# Patient Record
Sex: FEMALE | Race: BLACK OR AFRICAN AMERICAN | URBAN - METROPOLITAN AREA
[De-identification: names, ages, dates, MRNs, and addresses within clinical notes are randomized per-mention and may not be internally consistent; named-entity substitution may affect disease eponyms.]

---

## 2017-12-28 ENCOUNTER — OFFICE VISIT (OUTPATIENT)
Dept: FAMILY MEDICINE | Facility: CLINIC | Age: 47
End: 2017-12-28
Payer: COMMERCIAL

## 2017-12-28 VITALS
SYSTOLIC BLOOD PRESSURE: 154 MMHG | RESPIRATION RATE: 18 BRPM | TEMPERATURE: 99.1 F | HEIGHT: 66 IN | WEIGHT: 228.6 LBS | HEART RATE: 86 BPM | BODY MASS INDEX: 36.74 KG/M2 | OXYGEN SATURATION: 99 % | DIASTOLIC BLOOD PRESSURE: 92 MMHG

## 2017-12-28 DIAGNOSIS — R03.0 ELEVATED BLOOD PRESSURE READING WITHOUT DIAGNOSIS OF HYPERTENSION: ICD-10-CM

## 2017-12-28 DIAGNOSIS — Z00.00 HEALTH CARE MAINTENANCE: ICD-10-CM

## 2017-12-28 DIAGNOSIS — R53.1 RIGHT SIDED WEAKNESS: Primary | ICD-10-CM

## 2017-12-28 LAB
% GRANULOCYTES: 43.1 %G (ref 40–75)
GRANULOCYTES #: 3.3 K/UL (ref 1.6–8.3)
HCT VFR BLD AUTO: 39.2 % (ref 35–47)
HEMOGLOBIN: 12.2 G/DL (ref 11.7–15.7)
LYMPHOCYTES # BLD AUTO: 3.7 K/UL (ref 0.8–5.3)
LYMPHOCYTES NFR BLD AUTO: 47.7 %L (ref 20–48)
MCH RBC QN AUTO: 29.4 PG (ref 26.5–35)
MCHC RBC AUTO-ENTMCNC: 31.1 G/DL (ref 32–36)
MCV RBC AUTO: 94.5 FL (ref 78–100)
MID #: 0.7 K/UL (ref 0–2.2)
MID %: 9.2 %M (ref 0–20)
PLATELET # BLD AUTO: 378 K/UL (ref 150–450)
RBC # BLD AUTO: 4.15 M/UL (ref 3.8–5.2)
WBC # BLD AUTO: 7.7 K/UL (ref 4–11)

## 2017-12-28 NOTE — PATIENT INSTRUCTIONS
Here is the plan from today's visit    1. Right sided weakness  - CT Head w/o & w Contrast; Future  - CBC with Diff Plt ([facility])  - Lipid panel reflex to direct LDL Non-fasting; Future  - You will have to call to make the appointment for head CT.     2. Health care maintenance  - Pap imaged thin layer screen with HPV - recommended age 30 - 65 years (select HPV order below)    3. Elevated blood pressure reading without diagnosis of hypertension  - Basic Metabolic Panel ([facility])  - TSH with free T4 reflex      Please call or return to clinic if your symptoms don't go away.    Follow up plan  Please make a clinic appointment for follow up with me (YARI BAINS) in 4-6  weeks for follow up.    Thank you for coming to [facility] today.  Lab Testing:  **If you had lab testing today and your results are reassuring or normal they will be mailed to you or sent through [employer] within 7 days.   **If the lab tests need quick action we will call you with the results.  The phone number we will call with results is # [phone] (home) . If this is not the best number please call our clinic and change the number.  Medication Refills:  If you need any refills please call your pharmacy and they will contact us.   If you need to  your refill at a new pharmacy, please contact the new pharmacy directly. The new pharmacy will help you get your medications transferred faster.   Scheduling:  If you have any concerns about today's visit or wish to schedule another appointment please call our office during normal business hours [phone] (8-5:00 M-F)  If a referral was made to a [facility] and you don't get a call from central scheduling please call [phone].  If a Mammogram was ordered for you at [facility] call [phone] to schedule or change your appointment.  If you had an XRay/CT/Ultrasound/MRI ordered the number is [phone] to schedule or change your radiology  appointment.   Medical Concerns:  If you have urgent medical concerns please call [phone] at any time of the day.

## 2017-12-28 NOTE — LETTER
December 30, 2017      Vannessa ALLEN Oleksandr  [address] [place] [id number]        Dear Vannessa,    Thank you for getting your care at [facility]. Please see below for your test results.    Resulted Orders   CBC with Diff Plt (\[facility] [place] [facility]\"")   Result Value Ref Range    WBC 7.7 4.0 - 11.0 K/uL    Lymphocytes # 3.7 0.8 - 5.3 K/uL    % Lymphocytes 47.7 20.0 - 48.0 %L    Mid # 0.7 0.0 - 2.2 K/uL    Mid % 9.2 0.0 - 20.0 %M    GRANULOCYTES # 3.3 1.6 - 8.3 K/uL    % Granulocytes 43.1 40.0 - 75.0 %G    RBC 4.15 3.80 - 5.20 M/uL    Hemoglobin 12.2 11.7 - 15.7 g/dL    Hematocrit 39.2 35.0 - 47.0 %    MCV 94.5 78.0 - 100.0 fL    MCH 29.4 26.5 - 35.0 pg    MCHC 31.1 (L) 32.0 - 36.0 g/dL    Platelets 378.0 150.0 - 450.0 K/uL   Basic Metabolic Panel (\[facility] [place] [facility]\"")   Result Value Ref Range    Urea Nitrogen 15.7 7.0 - 19.0 mg/dL    Calcium 9.1 8.5 - 10.1 mg/dL    Chloride 102.6 98.0 - 110.0 mmol/L    Carbon Dioxide 28.0 20.0 - 32.0 mmol/L    Creatinine 0.7 0.5 - 1.0 mg/dL    Glucose 99.7 (H) 70.0 - 99.0 mg'dL    Potassium 3.8 3.3 - 4.5 mmol/dL    Sodium 136.8 132.6 - 141.4 mmol/L    GFR Estimate >90 >60.0 mL/min/1.7 m2    GFR Estimate If Black >90 >60.0 mL/min/1.7 m2   TSH with free T4 reflex   Result Value Ref Range    TSH 1.71 0.40 - 4.00 mU/L     You do not have any anemia, your thyroid is normal, your kidney function is normal. The cholesterol is the only thing I do not have, which would be ideal if you would come in for a lab only fasting cholesterol check to get the most accurate reading.     If you have any concerns about these results please call and leave a message for me or send a MyChart message to the clinic.    Sincerely,    Alannah Espitia MD

## 2017-12-28 NOTE — LETTER
January 5, 2018      Vannessa ALLEN Oleksandr  [address] [facility] [address]        Dear Vannessa,    Thank you for getting your care at [facility]. Please see below for your test results.    Resulted Orders   CBC with Diff Plt ([facility])   Result Value Ref Range    WBC 7.7 4.0 - 11.0 K/uL    Lymphocytes # 3.7 0.8 - 5.3 K/uL    % Lymphocytes 47.7 20.0 - 48.0 %L    Mid # 0.7 0.0 - 2.2 K/uL    Mid % 9.2 0.0 - 20.0 %M    GRANULOCYTES # 3.3 1.6 - 8.3 K/uL    % Granulocytes 43.1 40.0 - 75.0 %G    RBC 4.15 3.80 - 5.20 M/uL    Hemoglobin 12.2 11.7 - 15.7 g/dL    Hematocrit 39.2 35.0 - 47.0 %    MCV 94.5 78.0 - 100.0 fL    MCH 29.4 26.5 - 35.0 pg    MCHC 31.1 (L) 32.0 - 36.0 g/dL    Platelets 378.0 150.0 - 450.0 K/uL   Basic Metabolic Panel ([facility])   Result Value Ref Range    Urea Nitrogen 15.7 7.0 - 19.0 mg/dL    Calcium 9.1 8.5 - 10.1 mg/dL    Chloride 102.6 98.0 - 110.0 mmol/L    Carbon Dioxide 28.0 20.0 - 32.0 mmol/L    Creatinine 0.7 0.5 - 1.0 mg/dL    Glucose 99.7 (H) 70.0 - 99.0 mg'dL    Potassium 3.8 3.3 - 4.5 mmol/dL    Sodium 136.8 132.6 - 141.4 mmol/L    GFR Estimate >90 >60.0 mL/min/1.7 m2    GFR Estimate If Black >90 >60.0 mL/min/1.7 m2   TSH with free T4 reflex   Result Value Ref Range    TSH 1.71 0.40 - 4.00 mU/L   Pap imaged thin layer screen with HPV - recommended age 30 - 65 years (select HPV order below)   Result Value Ref Range    PAP NIL     Copath Report         Acc#:    Signed: 1/4/2018 08:20   MR#: [id number]    SPECIMEN/STAIN PROCESS:  Pap imaged thin layer prep screening (Surepath, FocalPoint with guided   screening)       Pap-Cyto x 1, HPV ordered x 1    SOURCE: Cervical, endocervical  ----------------------------------------------------------------   Pap imaged thin layer prep screening (Surepath, FocalPoint with guided   screening)  SPECIMEN ADEQUACY:  Satisfactory for evaluation.  -Transformation zone component present.    CYTOLOGIC INTERPRETATION:    Negative for intraepithelial  lesion or malignancy    Electronically signed by:  VAUGHN Carrington (ASCP)    CLINICAL HISTORY:    Post Menopausal, Previous normal pap  Date of Last Pap: 1/22/2010,    Papanicolaou Test Limitations:  Cervical cytology is a screening test with   limited sensitivity; regular  screening is critical for cancer prevention; Pap tests are primarily   effective for the diagnosis/prevention of  squamous cell carcinoma, not adenocarcinomas or other cancers.  [facility] LOCATION:  [address] [address], [phone]  Processed and screened at [facility]     HPV High Risk Types DNA Cervical   Result Value Ref Range    HPV 16 DNA Negative NEG^Negative    HPV 18 DNA Negative NEG^Negative    Other HR HPV Negative NEG^Negative    Final Diagnosis This patient's sample is negative for HPV DNA.       Comment:      (Note)  METHODOLOGY:  The Roche destiney 4800 system uses automated extraction,   simultaneous amplification of HPV (L1 region) and beta-globin,    followed by  real time detection of fluorescent labeled HPV and beta   globin using specific oligonucleotide probes . The test specifically   identifies types HPV 16 DNA and HPV 18 DNA while concurrently   detecting the rest of the high risk types (31, 33, 35, 39, 45, 51,   52, 56, 58, 59, 66 or 68).  COMMENTS:  This test is not intended for use as a screening device   for women under age 30 with normal cervical cytology.  Results should   be correlated with cytologic and histologic findings. Close clinical   followup is recommended.  This test was developed and its performance characteristics   determined by the [facility]. It has not been cleared or approved by the   FDA. The laboratory is regulated under CLIA as qualified to perform   high-complexity testing. This test is used    for clinical purposes. It   should not be regarded as investigational or for research.      Specimen Description Cervical Cells       Comment:      [id number]     Your Pap smear and HPV are negative. Your next Pap will be due in 5 years.     If you have any concerns about these results please call and leave a message for me or send a MyChart message to the clinic.    Sincerely,    Alannah Espitia MD

## 2017-12-28 NOTE — PROGRESS NOTES
Preceptor Attestation:   Patient seen and discussed with the resident. Assessment and plan reviewed with resident and agreed upon.   Supervising Physician:  Alexa Zavaleta MD  [facility]

## 2017-12-28 NOTE — PROGRESS NOTES
HPI:       Vannessa Leggett is a 47 year old who presents for the following  Patient presents with:  Establish Care  Neurologic Problem: Left arm numbness, slurred speech, head pressure       Dizziness/Vertigo  Onset: 1-2 days, had an episode 3 years ago with face drooping  What brings it on? Nothing in particular    Description:   Do you feel like you are going to faint?:  No   Does it feel like the surroundings (bed, room) are moving?: No   Have you felt unsteady/off balance?: No     Have you passed out or fallen?: No, but felt like she could have fallen out yesterday.    Intensity: 8/10, when Sx are present    Progression of Symptoms:  same    Accompanying Signs & Symptoms:  Heart palpitations: YES pt states that she has them when she doesn't get much sleep  Nausea, vomiting: YES nausea, with exhibiting dizziness Sx, and when she turns her head swiftly  Weakness in arms or legs:  YES 2 days ago, had weakness in left arm, went limp  Fatigue:  No  Vision or speech changes:  YES had some speech slurring 2 days ago  Ringing in ears (Tinnitus):No   Hearing Loss: No     History:   Head trauma/concussion hx:No   Previous similar symptoms:  YES, have had Sx in the past of dizziness  Recent bleeding history: No     Precipitating factors:   Worse with activity or head movement?:  YES only with head movement  Any new medications (BP?):No   Alcohol/drug abuse/withdrawal: No     Alleviating factors:   Does staying in a fixed position give relief?:yes sitting down helps  Therapies Tried and outcome: Nothing    Patient states that her family has prompted her to be seen by a doctor at this time.  She has not seen a physician in many years.  Reports that she will unlikely return to be seen for follow-up.  In any procedures, labs, or imaging that would be needed would need to be done now.  She denies taking any medication or over-the-counter meds or supplements.  She denies any changes in her diet.  She denies any history of  "cardiac disease, blood clots, or family history of strokes.  She denies any headaches, changes in vision, fever, chills, nausea, vomiting, abdominal pain, chest pain, shortness of breath, or changes in skin.  She reports that she has been postmenopausal for many years.  She denies having any menopausal symptoms that were concerning.  She is not currently sexually active.  She states that she is an active, healthy individual with no significant health history.    Problem, Medication and Allergy Lists were   reviewed and are current.   There are no active problems to display for this patient.  ,     No current outpatient prescriptions on file.   ,   No Known Allergies     Patient is a new patient to this clinic and so  I reviewed/updated the Past Medical History, the Family History and the Social History.          Review of Systems:   Review of Systems   Constitutional: Negative for appetite change, chills, fatigue and fever.   HENT: Negative for congestion, sinus pressure and sore throat.    Eyes: Negative for visual disturbance.   Respiratory: Negative for cough, shortness of breath and wheezing.    Cardiovascular: Negative for chest pain and leg swelling.   Gastrointestinal: Negative for abdominal distention, abdominal pain, blood in stool, constipation, diarrhea, nausea and vomiting.   Genitourinary: Negative for dysuria and hematuria.   Musculoskeletal: Negative for arthralgias and myalgias.   Skin: Negative for color change and rash.   Neurological: Negative for weakness and headaches.             Physical Exam:   Patient Vitals for the past 24 hrs:   BP Temp Temp src Pulse Resp SpO2 Height Weight   12/28/17 1625 151/83 99.1  F (37.3  C) Oral 86 18 99 % 5' 6.3\" (168.4 cm) 228 lb 9.6 oz (103.7 kg)     Body mass index is 36.56 kg/(m^2).  Vital signs normal except elevated blood pressure.      Physical Exam   Constitutional: She is oriented to person, place, and time. She appears well-developed and well-nourished. " No distress.   HENT:   Head: Normocephalic and atraumatic.   Nose: Nose normal.   Mouth/Throat: Oropharynx is clear and moist. No oropharyngeal exudate.   Eyes: Conjunctivae are normal. Pupils are equal, round, and reactive to light. Right eye exhibits no discharge. Left eye exhibits no discharge. No scleral icterus.   Neck: Normal range of motion. Neck supple. No thyromegaly present.   Cardiovascular: Normal rate, regular rhythm and normal heart sounds.  Exam reveals no gallop and no friction rub.    No murmur heard.  Pulmonary/Chest: Effort normal and breath sounds normal. No respiratory distress. She has no wheezes. She has no rales.   Abdominal: Soft. Bowel sounds are normal. She exhibits no mass. There is no tenderness.   Musculoskeletal: Normal range of motion. She exhibits no edema or tenderness.   Lymphadenopathy:     She has no cervical adenopathy.   Neurological: She is alert and oriented to person, place, and time. She displays no tremor and normal reflexes. No cranial nerve deficit or sensory deficit. She exhibits normal muscle tone. Coordination and gait normal.   Skin: Skin is warm. No rash noted. She is not diaphoretic.         Results:     Results for orders placed or performed in visit on 12/28/17   CBC with Diff Plt ([facility])   Result Value Ref Range    WBC 7.7 4.0 - 11.0 K/uL    Lymphocytes # 3.7 0.8 - 5.3 K/uL    % Lymphocytes 47.7 20.0 - 48.0 %L    Mid # 0.7 0.0 - 2.2 K/uL    Mid % 9.2 0.0 - 20.0 %M    GRANULOCYTES # 3.3 1.6 - 8.3 K/uL    % Granulocytes 43.1 40.0 - 75.0 %G    RBC 4.15 3.80 - 5.20 M/uL    Hemoglobin 12.2 11.7 - 15.7 g/dL    Hematocrit 39.2 35.0 - 47.0 %    MCV 94.5 78.0 - 100.0 fL    MCH 29.4 26.5 - 35.0 pg    MCHC 31.1 (L) 32.0 - 36.0 g/dL    Platelets 378.0 150.0 - 450.0 K/uL   Basic Metabolic Panel ([facility])   Result Value Ref Range    Urea Nitrogen 15.7 7.0 - 19.0 mg/dL    Calcium 9.1 8.5 - 10.1 mg/dL    Chloride 102.6 98.0 - 110.0 mmol/L    Carbon Dioxide 28.0 20.0 - 32.0  mmol/L    Creatinine 0.7 0.5 - 1.0 mg/dL    Glucose 99.7 (H) 70.0 - 99.0 mg'dL    Potassium 3.8 3.3 - 4.5 mmol/dL    Sodium 136.8 132.6 - 141.4 mmol/L    GFR Estimate >90 >60.0 mL/min/1.7 m2    GFR Estimate If Black >90 >60.0 mL/min/1.7 m2   TSH with free T4 reflex   Result Value Ref Range    TSH 1.71 0.40 - 4.00 mU/L         Assessment and Plan     Vannessa is a 47-year-old postmenopausal woman with no significant health history who presents to the clinic for symptoms concerning of stroke.  She does not have any current symptoms today, however has had 2 episodes of right-sided weakness, with slurring of words, and facial droop.  The symptoms seem to have resolved and she does not exhibit any sequelae of stroke.  Etiology is unknown.      Patient is due for multiple health maintenance screening, including mammogram, Pap smear. Patient is hypertensive on exam today, including on repeat blood pressure check, but she does not have formal diagnosis of hypertension and she is not on any medications.  Discussed that we would need to get a repeat blood pressure check, however patient is not agreeable for regular follow-up.    We will obtain an MRI and MRA of the brain as this will give us the best imaging for current and past possible infarction pathology.  Also, patient is unlikely to be agreeable for further testing.      1. Right sided weakness  - CBC with Diff Plt ([facility])  - Lipid panel reflex to direct LDL Fasting; Future  - MR Brain w/o & w Contrast; Future  - MRA Angiogram Brain; Future    2. Health care maintenance  - Pap imaged thin layer screen with HPV - recommended age 30 - 65 years (select HPV order below)    3. Elevated blood pressure reading without diagnosis of hypertension  - Basic Metabolic Panel ([facility])  - TSH with free T4 reflex    There are no discontinued medications.  Options for treatment and follow-up care were reviewed with the patient. Vannessa Leggett  engaged in the decision making process and  verbalized understanding of the options discussed and agreed with the final plan.    lAannah Espitia MD  [facility]  [phone]

## 2017-12-28 NOTE — MR AVS SNAPSHOT
After Visit Summary   12/28/2017    Vannessa Leggett    MRN: [id number]           Patient Information     Date Of Birth          1970        Visit Information        Provider Department      12/28/2017 4:00 PM Alannah Espitia MD [facility]        Today's Diagnoses     Right sided weakness    -  1    Health care maintenance        Elevated blood pressure reading without diagnosis of hypertension          Care Instructions    Here is the plan from today's visit    1. Right sided weakness  - CT Head w/o & w Contrast; Future  - CBC with Diff Plt ([facility])  - Lipid panel reflex to direct LDL Non-fasting; Future  - You will have to call to make the appointment for head CT.     2. Health care maintenance  - Pap imaged thin layer screen with HPV - recommended age 30 - 65 years (select HPV order below)    3. Elevated blood pressure reading without diagnosis of hypertension  - Basic Metabolic Panel ([facility])  - TSH with free T4 reflex      Please call or return to clinic if your symptoms don't go away.    Follow up plan  Please make a clinic appointment for follow up with me (ALANNAH ESPITIA) in 4-6  weeks for follow up.    Thank you for coming to [facility] today.  Lab Testing:  **If you had lab testing today and your results are reassuring or normal they will be mailed to you or sent through [employer] within 7 days.   **If the lab tests need quick action we will call you with the results.  The phone number we will call with results is # [phone] (home) . If this is not the best number please call our clinic and change the number.  Medication Refills:  If you need any refills please call your pharmacy and they will contact us.   If you need to  your refill at a new pharmacy, please contact the new pharmacy directly. The new pharmacy will help you get your medications transferred faster.   Scheduling:  If you have any concerns about today's visit or wish to  schedule another appointment please call our office during normal business hours [phone] (8-5:00 M-F)  If a referral was made to a [facility] and you don't get a call from central scheduling please call [phone].  If a Mammogram was ordered for you at [facility] call [phone] to schedule or change your appointment.  If you had an XRay/CT/Ultrasound/MRI ordered the number is [phone] to schedule or change your radiology appointment.   Medical Concerns:  If you have urgent medical concerns please call [phone] at any time of the day.            Follow-ups after your visit        Future tests that were ordered for you today     Open Future Orders        Priority Expected Expires Ordered    Lipid panel reflex to direct LDL Non-fasting Routine 12/28/2017 1/11/2018 12/28/2017    CT Head w/o & w Contrast Routine  12/28/2018 12/28/2017            Who to contact     Please call your clinic at [phone] to:    Ask questions about your health    Make or cancel appointments    Discuss your medicines    Learn about your test results    Speak to your doctor   If you have compliments or concerns about an experience at your clinic, or if you wish to file a complaint, please contact [facility] Patient Relations at [phone] or email us at [email or web address]         Additional Information About Your Visit        MyChart Information     [employer] is an electronic gateway that provides easy, online access to your medical records. With [employer], you can request a clinic appointment, read your test results, renew a prescription or communicate with your care team.     To sign up for [employer] visit the website at [email or web address]   You will be asked to enter the access code listed below, as well as some personal information. Please follow the directions to create your username and password.     Your access code is:  [email or web address]  Expires: 3/28/2018  5:11 PM     Your access code will  in 90 days. If you need help or a new code, please contact your [facility] or call [phone] for assistance.        Care EveryWhere ID     This is your Care EveryWhere ID. This could be used by other organizations to access your [place] medical records  [id number]        Your Vitals Were     Pulse Temperature Respirations Height Pulse Oximetry Breastfeeding?    86 99.1  F (37.3  C) (Oral) 18 5' 6.3\" (168.4 cm) 99% No    BMI (Body Mass Index)                   36.56 kg/m2            Blood Pressure from Last 3 Encounters:   17 (!) 154/92    Weight from Last 3 Encounters:   17 228 lb 9.6 oz (103.7 kg)              We Performed the Following     Basic Metabolic Panel ([facility])     CBC with Diff Plt ([facility])     Pap imaged thin layer screen with HPV - recommended age 30 - 65 years (select HPV order below)     TSH with free T4 reflex        Primary Care Provider Fax #    Physician No Ref-Primary [phone]       No address on file        Equal Access to Services     YEFRI CLARKE : Hadii juani Ayala, waaxda lujamir, qaybta kaalmada adelore, joanne soliman . So [facility] [phone].    ATENCIÓN: Si habla español, tiene a andino disposición servicios gratuitos de asistencia lingüística. Llame al [phone].    We comply with applicable federal civil rights laws and [place] laws. We do not discriminate on the basis of race, color, national origin, age, disability, sex, sexual orientation, or gender identity.            Thank you!     Thank you for choosing [facility]  for your care. Our goal is always to provide you with excellent care. Hearing back from our patients is one way we can continue to improve our services. Please take a few minutes to complete the written survey that you may receive in the mail after your visit with us. Thank " you!             Your Updated Medication List - Protect others around you: Learn how to safely use, store and throw away your medicines at www.disposemymeds.org.      Notice  As of 12/28/2017  5:11 PM    You have not been prescribed any medications.

## 2017-12-29 LAB
BUN SERPL-MCNC: 15.7 MG/DL (ref 7–19)
CALCIUM SERPL-MCNC: 9.1 MG/DL (ref 8.5–10.1)
CHLORIDE SERPLBLD-SCNC: 102.6 MMOL/L (ref 98–110)
CO2 SERPL-SCNC: 28 MMOL/L (ref 20–32)
CREAT SERPL-MCNC: 0.7 MG/DL (ref 0.5–1)
GFR SERPL CREATININE-BSD FRML MDRD: >90 ML/MIN/1.7 M2
GLUCOSE SERPL-MCNC: 99.7 MG'DL (ref 70–99)
POTASSIUM SERPL-SCNC: 3.8 MMOL/DL (ref 3.3–4.5)
SODIUM SERPL-SCNC: 136.8 MMOL/L (ref 132.6–141.4)
TSH SERPL DL<=0.005 MIU/L-ACNC: 1.71 MU/L (ref 0.4–4)

## 2017-12-29 ASSESSMENT — ENCOUNTER SYMPTOMS
WHEEZING: 0
SINUS PRESSURE: 0
CHILLS: 0
FEVER: 0
HEADACHES: 0
DYSURIA: 0
SORE THROAT: 0
MYALGIAS: 0
COUGH: 0
CONSTIPATION: 0
FATIGUE: 0
BLOOD IN STOOL: 0
APPETITE CHANGE: 0
SHORTNESS OF BREATH: 0
VOMITING: 0
ABDOMINAL PAIN: 0
ABDOMINAL DISTENTION: 0
NAUSEA: 0
HEMATURIA: 0
WEAKNESS: 0
COLOR CHANGE: 0
ARTHRALGIAS: 0
DIARRHEA: 0

## 2018-01-04 ENCOUNTER — HOSPITAL ENCOUNTER (OUTPATIENT)
Dept: MRI IMAGING | Facility: CLINIC | Age: 48
End: 2018-01-04
Attending: FAMILY MEDICINE
Payer: COMMERCIAL

## 2018-01-04 DIAGNOSIS — R53.1 RIGHT SIDED WEAKNESS: ICD-10-CM

## 2018-01-04 LAB
COPATH REPORT: NORMAL
PAP: NORMAL

## 2018-01-04 PROCEDURE — A9585 GADOBUTROL INJECTION: HCPCS | Performed by: FAMILY MEDICINE

## 2018-01-04 PROCEDURE — 70549 MR ANGIOGRAPH NECK W/O&W/DYE: CPT

## 2018-01-04 PROCEDURE — 25000128 H RX IP 250 OP 636: Performed by: FAMILY MEDICINE

## 2018-01-04 RX ORDER — GADOBUTROL 604.72 MG/ML
10 INJECTION INTRAVENOUS ONCE
Status: COMPLETED | OUTPATIENT
Start: 2018-01-04 | End: 2018-01-04

## 2018-01-04 RX ADMIN — GADOBUTROL 10 ML: 604.72 INJECTION INTRAVENOUS at 08:35

## 2018-01-05 LAB
FINAL DIAGNOSIS: NORMAL
HPV HR 12 DNA CVX QL NAA+PROBE: NEGATIVE
HPV16 DNA SPEC QL NAA+PROBE: NEGATIVE
HPV18 DNA SPEC QL NAA+PROBE: NEGATIVE
SPECIMEN DESCRIPTION: NORMAL

## 2018-03-13 ENCOUNTER — RADIANT APPOINTMENT (OUTPATIENT)
Dept: GENERAL RADIOLOGY | Facility: CLINIC | Age: 48
End: 2018-03-13
Attending: FAMILY MEDICINE
Payer: COMMERCIAL

## 2018-03-13 ENCOUNTER — OFFICE VISIT (OUTPATIENT)
Dept: FAMILY MEDICINE | Facility: CLINIC | Age: 48
End: 2018-03-13
Payer: COMMERCIAL

## 2018-03-13 VITALS
OXYGEN SATURATION: 96 % | SYSTOLIC BLOOD PRESSURE: 139 MMHG | BODY MASS INDEX: 37.75 KG/M2 | TEMPERATURE: 98.1 F | HEART RATE: 75 BPM | WEIGHT: 236 LBS | RESPIRATION RATE: 18 BRPM | DIASTOLIC BLOOD PRESSURE: 84 MMHG

## 2018-03-13 DIAGNOSIS — M89.8X9 BONY GROWTH: ICD-10-CM

## 2018-03-13 DIAGNOSIS — M54.6 CHRONIC MIDLINE THORACIC BACK PAIN: ICD-10-CM

## 2018-03-13 DIAGNOSIS — G89.29 CHRONIC MIDLINE THORACIC BACK PAIN: ICD-10-CM

## 2018-03-13 DIAGNOSIS — M89.8X9 BONY GROWTH: Primary | ICD-10-CM

## 2018-03-13 NOTE — MR AVS SNAPSHOT
After Visit Summary   3/13/2018    Vannessa Leggett    MRN: [id number]           Patient Information     Date Of Birth          1970        Visit Information        Provider Department      3/13/2018 2:40 PM Alannah Espitia MD [facility]        Today's Diagnoses     Bony growth    -  1      Care Instructions    Here is the plan from today's visit    1. Bony growth  - XR Hand Right G/E 3 Views; Future      Please call or return to clinic if your symptoms don't go away.    Follow up plan  Please make a clinic appointment for follow up with me (ALANNAH ESPITIA) and Yannick Salas MD in 1-4  weeks for follow up and OMT.    Thank you for coming to [facility] today.  Lab Testing:  **If you had lab testing today and your results are reassuring or normal they will be mailed to you or sent through [employer] within 7 days.   **If the lab tests need quick action we will call you with the results.  The phone number we will call with results is # [phone] (home) . If this is not the best number please call our clinic and change the number.  Medication Refills:  If you need any refills please call your pharmacy and they will contact us.   If you need to  your refill at a new pharmacy, please contact the new pharmacy directly. The new pharmacy will help you get your medications transferred faster.   Scheduling:  If you have any concerns about today's visit or wish to schedule another appointment please call our office during normal business hours [phone] (8-5:00 M-F)  If a referral was made to a [facility] and you don't get a call from central scheduling please call [phone].  If a Mammogram was ordered for you at [facility] call [phone] to schedule or change your appointment.  If you had an XRay/CT/Ultrasound/MRI ordered the number is [phone] to schedule or change your radiology appointment.   Medical Concerns:  If  you have urgent medical concerns please call [phone] at any time of the day.    Alannah Espitia MD            Follow-ups after your visit        Future tests that were ordered for you today     Open Future Orders        Priority Expected Expires Ordered    XR Hand Right G/E 3 Views Routine 3/13/2018 3/13/2019 3/13/2018            Who to contact     Please call your clinic at [phone] to:    Ask questions about your health    Make or cancel appointments    Discuss your medicines    Learn about your test results    Speak to your doctor            Additional Information About Your Visit        [employer] Information     Secerno is an electronic gateway that provides easy, online access to your medical records. With Secerno, you can request a clinic appointment, read your test results, renew a prescription or communicate with your care team.     To sign up for Secerno visit the website at [email or web address]   You will be asked to enter the access code listed below, as well as some personal information. Please follow the directions to create your username and password.     Your access code is: [email or web address]  Expires: 3/28/2018  6:11 PM     Your access code will  in 90 days. If you need help or a new code, please contact your [facility] or call [phone] for assistance.        Care EveryWhere ID     This is your Care EveryWhere ID. This could be used by other organizations to access your [place] medical records  [id number]        Your Vitals Were     Pulse Temperature Respirations Pulse Oximetry Breastfeeding? BMI (Body Mass Index)    75 98.1  F (36.7  C) (Oral) 18 96% No 37.75 kg/m2       Blood Pressure from Last 3 Encounters:   18 139/84   17 (!) 154/92    Weight from Last 3 Encounters:   18 236 lb (107 kg)   17 228 lb 9.6 oz (103.7 kg)               Primary Care Provider Fax #    Physician No Ref-Primary [phone]       No address  on file        Equal Access to Services     EDISRIRAM VINCE : Hadii aad ku hadoumouamando Ayala, janna alicia, pamhaseeb harrisonmamarcia pierson, joanne posadas. So [facility] [phone].    ATENCIÓN: Si habla español, tiene a andino disposición servicios gratuitos de asistencia lingüística. Llame al [phone].    We comply with applicable federal civil rights laws and [place] laws. We do not discriminate on the basis of race, color, national origin, age, disability, sex, sexual orientation, or gender identity.            Thank you!     Thank you for choosing [facility]  for your care. Our goal is always to provide you with excellent care. Hearing back from our patients is one way we can continue to improve our services. Please take a few minutes to complete the written survey that you may receive in the mail after your visit with us. Thank you!             Your Updated Medication List - Protect others around you: Learn how to safely use, store and throw away your medicines at www.disposemymeds.org.      Notice  As of 3/13/2018  3:34 PM    You have not been prescribed any medications.

## 2018-03-13 NOTE — PATIENT INSTRUCTIONS
Here is the plan from today's visit    1. Bony growth  - XR Hand Right G/E 3 Views; Future      Please call or return to clinic if your symptoms don't go away.    Follow up plan  Please make a clinic appointment for follow up with me (ALANNAH ESPITIA) and Yannick Salas MD in 1-4  weeks for follow up and OMT.    Thank you for coming to [facility] today.  Lab Testing:  **If you had lab testing today and your results are reassuring or normal they will be mailed to you or sent through [employer] within 7 days.   **If the lab tests need quick action we will call you with the results.  The phone number we will call with results is # [phone] (home) . If this is not the best number please call our clinic and change the number.  Medication Refills:  If you need any refills please call your pharmacy and they will contact us.   If you need to  your refill at a new pharmacy, please contact the new pharmacy directly. The new pharmacy will help you get your medications transferred faster.   Scheduling:  If you have any concerns about today's visit or wish to schedule another appointment please call our office during normal business hours [phone] (8-5:00 M-F)  If a referral was made to a [facility] and you don't get a call from central scheduling please call [phone].  If a Mammogram was ordered for you at [facility] call [phone] to schedule or change your appointment.  If you had an XRay/CT/Ultrasound/MRI ordered the number is [phone] to schedule or change your radiology appointment.   Medical Concerns:  If you have urgent medical concerns please call [phone] at any time of the day.    Alannah Espitia MD

## 2018-03-13 NOTE — PROGRESS NOTES
HPI:       Vannessa Leggett is a 48 year old who presents for the following  Patient presents with:  Hand Pain: Rt finger pain     Patient reports that several weeks ago started to have  left index DIP itchiness and swelling.  It was initially mildly painful but now she noticed that it is firm, nontender, and is immobile.  It does not affect her range of motion or hand strength.     She has not had any further episodes of left arm weakness since she was seen last with a normal MRI.    Does not endorse upper back tenderness that is worse with sitting.  She denies any neck pain or decreased range of motion of her neck or arms.    Problem, Medication and Allergy Lists were   reviewed and are current.   There are no active problems to display for this patient.  ,     No current outpatient prescriptions on file.   ,   No Known Allergies  Patient is an established patient of this clinic.         Review of Systems:   Review of Systems   Constitutional: Negative for appetite change, chills, fatigue and fever.   HENT: Negative for congestion, sinus pressure and sore throat.    Eyes: Negative for visual disturbance.   Respiratory: Negative for cough, shortness of breath and wheezing.    Cardiovascular: Negative for chest pain and leg swelling.   Gastrointestinal: Negative for abdominal distention, abdominal pain, blood in stool, constipation, diarrhea, nausea and vomiting.   Genitourinary: Negative for dysuria and hematuria.   Musculoskeletal: Positive for back pain (upper thoracic). Negative for arthralgias and myalgias.   Skin: Negative for color change and rash.   Neurological: Negative for weakness and headaches.             Physical Exam:   Patient Vitals for the past 24 hrs:   BP Temp Temp src Pulse Resp SpO2 Weight   03/13/18 1447 139/84 98.1  F (36.7  C) Oral 75 18 96 % 236 lb (107 kg)     Body mass index is 37.75 kg/(m^2).  Vitals were reviewed and were normal     Physical Exam   Constitutional: She appears  well-developed and well-nourished. No distress.   HENT:   Head: Normocephalic and atraumatic.   Eyes: Conjunctivae are normal. No scleral icterus.   Neck: Normal range of motion. Neck supple.   Cardiovascular: Normal rate and regular rhythm.    No murmur heard.  Pulmonary/Chest: Effort normal and breath sounds normal. No respiratory distress. She has no wheezes.   Musculoskeletal:        Cervical back: She exhibits tenderness. She exhibits normal range of motion and no swelling.        Back:    Skin: She is not diaphoretic.         Results:     Result Exam: 3 views of the left hand and wrist dated 3/13/2018.     COMPARISON: None.  CLINICAL HISTORY: Bony growth.     FINDINGS: AP, oblique, and lateral views of the left hand and wrist  were obtained. The bones are well aligned. The joint spaces are  well-maintained. No erosive changes. No displaced fractures.     IMPRESSION: No acute bone abnormality in the left hand or wrist.    Assessment and Plan     Vannessa is a 48-year-old woman who presents with firm, nontender nodule on the left PIP with unremarkable x-ray.  As this growth does not appear to affect her range of motion or strength likely a benign growth.  Will need to continue to monitor.  Did discuss patient seeing OMT for her midline upper back pain likely due to poor posture.    1. Bony growth  - XR Hand Left G/E 3 Views; Unremarkable.     2. Chronic midline thoracic back pain  - recommended patient see OMT   - discussed appropriate posture    There are no discontinued medications.  Options for treatment and follow-up care were reviewed with the patient. Vannessamarcia Leggett  engaged in the decision making process and verbalized understanding of the options discussed and agreed with the final plan.    Alannah Espitia MD  [facility]  [phone]

## 2018-03-13 NOTE — Clinical Note
Do you think that growth is a bone spur? I don't know if I see any overt signs of arthritis. Please let me know what you think and I will change my note accordingly. Thanks, Cora

## 2018-03-13 NOTE — PROGRESS NOTES
Preceptor Attestation:  Patient seen and discussed with the resident. Assessment and plan reviewed with resident and agreed upon.  Supervising Physician:  Mare Lang MD

## 2018-03-14 ENCOUNTER — OFFICE VISIT (OUTPATIENT)
Dept: FAMILY MEDICINE | Facility: CLINIC | Age: 48
End: 2018-03-14
Payer: COMMERCIAL

## 2018-03-14 VITALS
HEART RATE: 75 BPM | DIASTOLIC BLOOD PRESSURE: 89 MMHG | OXYGEN SATURATION: 100 % | WEIGHT: 236.4 LBS | SYSTOLIC BLOOD PRESSURE: 153 MMHG | BODY MASS INDEX: 37.81 KG/M2 | TEMPERATURE: 98.2 F

## 2018-03-14 DIAGNOSIS — M54.2 CERVICALGIA: Primary | ICD-10-CM

## 2018-03-14 DIAGNOSIS — M99.9 NONALLOPATHIC LESION OF THORACIC REGION: ICD-10-CM

## 2018-03-14 DIAGNOSIS — M99.9 NONALLOPATHIC LESION OF LUMBAR REGION: ICD-10-CM

## 2018-03-14 DIAGNOSIS — M99.9 NONALLOPATHIC LESION OF CERVICAL REGION: ICD-10-CM

## 2018-03-14 DIAGNOSIS — G89.29 CHRONIC BILATERAL THORACIC BACK PAIN: ICD-10-CM

## 2018-03-14 DIAGNOSIS — M99.00 SOMATIC DYSFUNCTION OF HEAD REGION: ICD-10-CM

## 2018-03-14 DIAGNOSIS — M54.6 CHRONIC BILATERAL THORACIC BACK PAIN: ICD-10-CM

## 2018-03-14 ASSESSMENT — ENCOUNTER SYMPTOMS
FATIGUE: 0
SHORTNESS OF BREATH: 0
NAUSEA: 0
CONSTIPATION: 0
DYSURIA: 0
SINUS PRESSURE: 0
WEAKNESS: 0
MYALGIAS: 0
BLOOD IN STOOL: 0
ARTHRALGIAS: 0
SORE THROAT: 0
CHILLS: 0
VOMITING: 0
ABDOMINAL DISTENTION: 0
HEADACHES: 0
BACK PAIN: 1
COUGH: 0
APPETITE CHANGE: 0
COLOR CHANGE: 0
DIARRHEA: 0
HEMATURIA: 0
FEVER: 0
ABDOMINAL PAIN: 0
WHEEZING: 0

## 2018-03-14 NOTE — PATIENT INSTRUCTIONS
PLAN      10 minute yoga stretch for back pain  https://www.Poll [email or web address]/watch?v=DoROC13Bgxq    Door stretches (10 times) daily    Keep working on posture      Follow up: 2-4 weeks

## 2018-03-14 NOTE — MR AVS SNAPSHOT
After Visit Summary   3/14/2018    Vannessa Leggett    MRN: [id number]           Patient Information     Date Of Birth          1970        Visit Information        Provider Department      3/14/2018 8:40 AM Yannick Salas DO [facility]        Care Instructions    PLAN      10 minute yoga stretch for back pain  https://[email or web address] Mobile [email or web address]/watch?v=QjXOD68Qhvq    Door stretches (10 times) daily    Keep working on posture      Follow up: 2-4 weeks            Follow-ups after your visit        Who to contact     Please call your clinic at [phone] to:    Ask questions about your health    Make or cancel appointments    Discuss your medicines    Learn about your test results    Speak to your doctor            Additional Information About Your Visit        MyChart Information     Payoff is an electronic gateway that provides easy, online access to your medical records. With Payoff, you can request a clinic appointment, read your test results, renew a prescription or communicate with your care team.     To sign up for [employer] visit the website at [email or web address]   You will be asked to enter the access code listed below, as well as some personal information. Please follow the directions to create your username and password.     Your access code is: [email or web address]  Expires: 3/28/2018  6:11 PM     Your access code will  in 90 days. If you need help or a new code, please contact your [facility] or call [phone] for assistance.        Care EveryWhere ID     This is your Care EveryWhere ID. This could be used by other organizations to access your [place] medical records  [id number]        Your Vitals Were     Pulse Temperature Pulse Oximetry Breastfeeding? BMI (Body Mass Index)       75 98.2  F (36.8  C) (Oral) 100% No 37.81 kg/m2        Blood Pressure from Last 3 Encounters:   18 153/89   18 139/84   17 (!)  154/92    Weight from Last 3 Encounters:   03/14/18 236 lb 6.4 oz (107.2 kg)   03/13/18 236 lb (107 kg)   12/28/17 228 lb 9.6 oz (103.7 kg)              Today, you had the following     No orders found for display       Primary Care Provider Fax #    Physician No Ref-Primary [phone]       No address on file        Equal Access to Services     [facility] : Hadii aad ku hadasho Soomaali, waaxda luqadaha, qaybta kaalmada adeegyada, waxjohn luain hayaan demar galeanamegangigi soliman . So [facility] [phone].    ATENCIÓN: Si habla español, tiene a andino disposición servicios gratuitos de asistencia lingüística. Llame al [phone].    We comply with applicable federal civil rights laws and [place] laws. We do not discriminate on the basis of race, color, national origin, age, disability, sex, sexual orientation, or gender identity.            Thank you!     Thank you for choosing [facility]  for your care. Our goal is always to provide you with excellent care. Hearing back from our patients is one way we can continue to improve our services. Please take a few minutes to complete the written survey that you may receive in the mail after your visit with us. Thank you!             Your Updated Medication List - Protect others around you: Learn how to safely use, store and throw away your medicines at www.disposemymeds.org.      Notice  As of 3/14/2018  9:22 AM    You have not been prescribed any medications.

## 2018-03-14 NOTE — PROGRESS NOTES
OMT Visit    Vannessa Leggett is a 48 year old year old female who is being seen today for OMT.    Chief Complaint: Patient presents with:  OMT: OMT    Patient has been having chronic pain of her neck and upper back.  She complains of pain especially at a lump at her upper back.  She works at a computer.  She has had multiple recent stressors including a lawsuit.  She has no numbness or radicular symptoms.  No weakness.      PAST MEDICAL HISTORY: History reviewed. No pertinent past medical history.    PAST SURGICAL HISTORY: History reviewed. No pertinent surgical history.    Social History     Social History     Marital status: Single     Spouse name: N/A     Number of children: N/A     Years of education: N/A     Social History Main Topics     Smoking status: Never Smoker     Smokeless tobacco: Never Used     Alcohol use No     Drug use: No     Sexual activity: Yes     Partners: Male     Birth control/ protection: None, Post-menopausal     Other Topics Concern     None     Social History Narrative       No current outpatient prescriptions on file.    Exam    Physical Exam  Constitutional: alert, well developed, well nourished, NAD  Musculoskeletal: posture with shoulders forward   Somatic dysfunctions:  Head: suboccipital hypertonicity and tenderness  Cervical: paraspinal muscles hypertonic and tender to palpation; trapezius (R>L) and scalenes hypertonic and tender to palpation  Thoracic: paraspinal muscles hypertonic and tender to palpation; pectoralis muscles tender to palpation and hypertonic  Lumbar: paraspinal muscles hypertonic and tender to palpation     Treatment    Techniques: soft tissue, counterstrain, myofascial release (suboccipital release, pec release, trap release)       Assessment & Plan    Somatic dysfunction of head, cervical, thoracic, lumbar  Patient tolerated OMT procedure well with improvement in ROM and decreased pain.  I think she would continue to benefit from OMT.  She may also benefit  from PT.  For now, we will start off with home exercises.  Recommended door exercises to open up chest wall and loosen pecs.  Recommended upper back exercises to strengthen back muscles and open up chest wall.  Follow up in 2-4 weeks for OMT or as needed.  -     OSTEOPATHIC MANIP,3-4 BODY REGN      Yannick Salas DO, PGY3    Staffed with and seen by Dr. Harris.

## 2018-03-14 NOTE — PROGRESS NOTES
Preceptor Attestation:  I was present for key portions of the OMT procedure.  Patient seen and discussed with the resident. Assessment and plan reviewed with resident and agreed upon.   Supervising Physician:  [facility]

## 2018-05-24 ENCOUNTER — OFFICE VISIT (OUTPATIENT)
Dept: FAMILY MEDICINE | Facility: CLINIC | Age: 48
End: 2018-05-24
Payer: COMMERCIAL

## 2018-05-24 VITALS
DIASTOLIC BLOOD PRESSURE: 75 MMHG | SYSTOLIC BLOOD PRESSURE: 178 MMHG | WEIGHT: 243.6 LBS | BODY MASS INDEX: 38.96 KG/M2 | RESPIRATION RATE: 16 BRPM | OXYGEN SATURATION: 100 % | TEMPERATURE: 98 F | HEART RATE: 76 BPM

## 2018-05-24 DIAGNOSIS — I10 BENIGN ESSENTIAL HYPERTENSION: ICD-10-CM

## 2018-05-24 DIAGNOSIS — F41.0 PANIC ATTACK: ICD-10-CM

## 2018-05-24 DIAGNOSIS — R00.2 PALPITATIONS: Primary | ICD-10-CM

## 2018-05-24 LAB
BUN SERPL-MCNC: 20.4 MG/DL (ref 7–19)
CALCIUM SERPL-MCNC: 10.1 MG/DL (ref 8.5–10.1)
CHLORIDE SERPLBLD-SCNC: 101.3 MMOL/L (ref 98–110)
CHOLEST SERPL-MCNC: 196 MG/DL (ref 0–200)
CHOLEST/HDLC SERPL: 3.4 {RATIO} (ref 0–5)
CO2 SERPL-SCNC: 28.5 MMOL/L (ref 20–32)
CREAT SERPL-MCNC: 0.7 MG/DL (ref 0.5–1)
GFR SERPL CREATININE-BSD FRML MDRD: >90 ML/MIN/1.7 M2
GLUCOSE SERPL-MCNC: 94.9 MG'DL (ref 70–99)
HBA1C MFR BLD: 5.5 % (ref 4.1–5.7)
HDLC SERPL-MCNC: 58.1 MG/DL
LDLC SERPL CALC-MCNC: 109 MG/DL (ref 0–129)
POTASSIUM SERPL-SCNC: 3.9 MMOL/DL (ref 3.3–4.5)
SODIUM SERPL-SCNC: 140.2 MMOL/L (ref 132.6–141.4)
TRIGL SERPL-MCNC: 143.4 MG/DL (ref 0–150)
TSH SERPL DL<=0.005 MIU/L-ACNC: 1.35 MU/L (ref 0.4–4)
VLDL CHOLESTEROL: 28.7 MG/DL (ref 7–32)

## 2018-05-24 RX ORDER — AMLODIPINE BESYLATE 10 MG/1
5 TABLET ORAL DAILY
Qty: 30 TABLET | Refills: 0 | Status: SHIPPED | OUTPATIENT
Start: 2018-05-24

## 2018-05-24 RX ORDER — HYDROXYZINE HYDROCHLORIDE 25 MG/1
25-50 TABLET, FILM COATED ORAL EVERY 6 HOURS PRN
Qty: 30 TABLET | Refills: 0 | Status: SHIPPED | OUTPATIENT
Start: 2018-05-24 | End: 2018-06-04

## 2018-05-24 ASSESSMENT — ENCOUNTER SYMPTOMS
SINUS PRESSURE: 0
HEADACHES: 1
ABDOMINAL DISTENTION: 0
DIARRHEA: 0
CONSTIPATION: 0
ARTHRALGIAS: 0
NERVOUS/ANXIOUS: 1
EYE PAIN: 0
DIZZINESS: 1
VOMITING: 0
PHOTOPHOBIA: 0
CHILLS: 0
LIGHT-HEADEDNESS: 0
SHORTNESS OF BREATH: 0
NAUSEA: 0
SINUS PAIN: 0
DECREASED CONCENTRATION: 1
MYALGIAS: 0
DYSPHORIC MOOD: 0
ABDOMINAL PAIN: 0
PALPITATIONS: 1
FEVER: 0
WHEEZING: 0
DIAPHORESIS: 0
CHEST TIGHTNESS: 0

## 2018-05-24 ASSESSMENT — ANXIETY QUESTIONNAIRES
IF YOU CHECKED OFF ANY PROBLEMS ON THIS QUESTIONNAIRE, HOW DIFFICULT HAVE THESE PROBLEMS MADE IT FOR YOU TO DO YOUR WORK, TAKE CARE OF THINGS AT HOME, OR GET ALONG WITH OTHER PEOPLE: VERY DIFFICULT
6. BECOMING EASILY ANNOYED OR IRRITABLE: SEVERAL DAYS
3. WORRYING TOO MUCH ABOUT DIFFERENT THINGS: NEARLY EVERY DAY
7. FEELING AFRAID AS IF SOMETHING AWFUL MIGHT HAPPEN: SEVERAL DAYS
1. FEELING NERVOUS, ANXIOUS, OR ON EDGE: NEARLY EVERY DAY
GAD7 TOTAL SCORE: 14
2. NOT BEING ABLE TO STOP OR CONTROL WORRYING: NEARLY EVERY DAY
5. BEING SO RESTLESS THAT IT IS HARD TO SIT STILL: NOT AT ALL

## 2018-05-24 ASSESSMENT — PATIENT HEALTH QUESTIONNAIRE - PHQ9: 5. POOR APPETITE OR OVEREATING: NEARLY EVERY DAY

## 2018-05-24 NOTE — MR AVS SNAPSHOT
After Visit Summary   2018    Vannessa Leggett    MRN: [id number]           Patient Information     Date Of Birth          1970        Visit Information        Provider Department      2018 1:00 PM Danie Parker MD [facility]        Today's Diagnoses     Palpitations    -  1    Benign essential hypertension        Panic attack           Follow-ups after your visit        Additional Services     BEHAVIORAL HEALTH REFERRAL ([facility])       Referring MD: DANIE PARKER    May leave message on voicemail: Yes  PHQ-9 Score: 14  GAD7 Score: 14                  Your next 10 appointments already scheduled     May 29, 2018 10:40 AM CDT   Return Visit with DO [facility] ([facility])    [address],  [address]  [place] [address]   [phone]              Who to contact     Please call your clinic at [phone] to:    Ask questions about your health    Make or cancel appointments    Discuss your medicines    Learn about your test results    Speak to your doctor            Additional Information About Your Visit        MyChart Information     PeerReach is an electronic gateway that provides easy, online access to your medical records. With PeerReach, you can request a clinic appointment, read your test results, renew a prescription or communicate with your care team.     To sign up for MyDocTimet visit the website at [email or web address]   You will be asked to enter the access code listed below, as well as some personal information. Please follow the directions to create your username and password.     Your access code is: [email or web address]  Expires: 2018 12:57 PM     Your access code will  in 90 days. If you need help or a new code, please contact your [facility] or call [phone] for assistance.        Care EveryWhere ID     This  is your Care EveryWhere ID. This could be used by other organizations to access your [place] medical records  [id number]        Your Vitals Were     Pulse Temperature Respirations Pulse Oximetry BMI (Body Mass Index)       76 98  F (36.7  C) (Oral) 16 100% 38.96 kg/m2        Blood Pressure from Last 3 Encounters:   05/24/18 178/75   03/14/18 153/89   03/13/18 139/84    Weight from Last 3 Encounters:   05/24/18 243 lb 9.6 oz (110.5 kg)   03/14/18 236 lb 6.4 oz (107.2 kg)   03/13/18 236 lb (107 kg)              We Performed the Following     Basic Metabolic Panel ([facility])     BEHAVIORAL HEALTH REFERRAL ([facility])     Hemoglobin A1c ([facility])     Lipid Colbert ([facility])     TSH with free T4 reflex          Today's Medication Changes          These changes are accurate as of 5/24/18  2:25 PM.  If you have any questions, ask your nurse or doctor.               Start taking these medicines.        Dose/Directions    amLODIPine 10 MG tablet   Commonly known as:  NORVASC   Used for:  Benign essential hypertension   Started by:  Wilber Anton MD        Dose:  5 mg   Take 0.5 tablets (5 mg) by mouth daily   Quantity:  30 tablet   Refills:  0       hydrOXYzine 25 MG tablet   Commonly known as:  ATARAX   Used for:  Panic attack   Started by:  Wilber Anton MD        Dose:  25-50 mg   Take 1-2 tablets (25-50 mg) by mouth every 6 hours as needed for anxiety   Quantity:  30 tablet   Refills:  0            Where to get your medicines      These medications were sent to [facility] [place], [place] - [address] [facility] [address]     Phone:  [phone]     amLODIPine 10 MG tablet    hydrOXYzine 25 MG tablet                Primary Care Provider Office Phone # Fax #    Alannah Espitia [phone]       [facility] [address]  [facility] [address]        Equal Access to Services     YEFRI CLARKE AH: Ronan gloria  lizzy Ayala, wachidi paceulissesha, qastoneyta kajoan derejelore, joanne stoneyin hayaamalvin reyezlayla kervinisaiah laPallavichris cuauhtemoc. So [facility] [phone].    ATENCIÓN: Si habla español, tiene a andino disposición servicios gratuitos de asistencia lingüística. Jonnie al [phone].    We comply with applicable federal civil rights laws and [place] laws. We do not discriminate on the basis of race, color, national origin, age, disability, sex, sexual orientation, or gender identity.            Thank you!     Thank you for choosing [facility]  for your care. Our goal is always to provide you with excellent care. Hearing back from our patients is one way we can continue to improve our services. Please take a few minutes to complete the written survey that you may receive in the mail after your visit with us. Thank you!             Your Updated Medication List - Protect others around you: Learn how to safely use, store and throw away your medicines at www.disposemymeds.org.          This list is accurate as of 5/24/18  2:25 PM.  Always use your most recent med list.                   Brand Name Dispense Instructions for use Diagnosis    amLODIPine 10 MG tablet    NORVASC    30 tablet    Take 0.5 tablets (5 mg) by mouth daily    Benign essential hypertension       hydrOXYzine 25 MG tablet    ATARAX    30 tablet    Take 1-2 tablets (25-50 mg) by mouth every 6 hours as needed for anxiety    Panic attack

## 2018-05-24 NOTE — PROGRESS NOTES
"      HPI:       Vannessa Leggett is a 48 year old who presents for the following  Patient presents with:  Headache: sharp pain in middle of forehad bothering her; started Sunday or Monday  Palpitations: increased palpitations since last Friday; more after hearing bad news    Vannessa has recently noticed elevated heart rate, headaches, and nausea for 1 week. She has been going through a lawsuit over a loan which was initially dismissed but she received a letter on Friday stating that it had been reopened. Upon reading this her heart rate started increasing to uncomfortably increased levels. She had no chest pain, no pain in her left arm or neck. She has no history of heart disease but has been told she has a heart murmur (\"small hole in my heart\"). On Monday she was crying to her sister and felt a severe headache in the her midline forehead. The pain was sharp, 9/10 in severity, and did not radiate. She did not have any photophobia. She felt warm and tried to calm herself down. Her headache eventually resolved after 30-60 minutes when she felt fully calm. On Wednesday she received news of her father-in-law's death and shortly afterwards she developed nausea while riding in a car, notes that it felt like motion sickness with dizziness. At this point in time she also noticed the palpitations and headache returned, rating 4/10 in severity. She has also noticed decreased concentrating and feeling anxious, both of which have affected her job. Of note her mother had a stroke and her son has a history of frequent headaches. Additionally, she has been told that she has elevated blood pressure and that she should treat this.    Problem, Medication and Allergy Lists were reviewed and are current.  Patient is an established patient of this clinic.         Review of Systems:   Review of Systems   Constitutional: Negative for chills, diaphoresis and fever.   HENT: Negative for congestion, sinus pain and sinus pressure.    Eyes: " Negative for photophobia and pain.   Respiratory: Negative for chest tightness, shortness of breath and wheezing.    Cardiovascular: Positive for palpitations. Negative for chest pain.   Gastrointestinal: Negative for abdominal distention, abdominal pain, constipation, diarrhea, nausea and vomiting.   Endocrine: Negative for cold intolerance and heat intolerance.   Musculoskeletal: Negative for arthralgias and myalgias.   Skin: Negative for pallor and rash.   Allergic/Immunologic: Positive for environmental allergies. Negative for food allergies.   Neurological: Positive for dizziness and headaches. Negative for light-headedness.   Psychiatric/Behavioral: Positive for decreased concentration. Negative for dysphoric mood. The patient is nervous/anxious.              Physical Exam:   Patient Vitals for the past 24 hrs:   BP Temp Temp src Pulse Resp SpO2 Weight   05/24/18 1316 178/75 - - - - - -   05/24/18 1315 162/90 98  F (36.7  C) Oral 76 16 100 % 243 lb 9.6 oz (110.5 kg)     Body mass index is 38.96 kg/(m^2).  Vital signs normal except the pt was hypertensive (162-178/85-90).    Physical Exam   Constitutional: She is oriented to person, place, and time. She appears well-nourished. No distress.   HENT:   Head: Normocephalic and atraumatic.   Nose: Nose normal.   Eyes: Conjunctivae and EOM are normal.   Neck: Normal range of motion. Neck supple.   Cardiovascular: Normal rate and regular rhythm.  Exam reveals no gallop and no friction rub.    No murmur heard.  Early systolic decrescendo murmur.   Pulmonary/Chest: Effort normal and breath sounds normal. No respiratory distress. She has no wheezes. She has no rales.   Abdominal: Soft. Bowel sounds are normal. She exhibits no distension. There is no tenderness.   Neurological: She is alert and oriented to person, place, and time.   Skin: Skin is warm and dry. No rash noted. No pallor.   Psychiatric: She has a normal mood and affect. Her behavior is normal. Judgment and  thought content normal.         Results:   No results found for this or any previous visit (from the past 24 hour(s)).     Assessment and Plan     1. Palpitations  2. Panic attack  Palpitations, headaches, and nausea in the setting of recent significant stressors. No chest pain, diaphoresis. No history of similar problems or cardiac disease. Likely panic attack 2/2 stressors. Ordered TSH to rule out hyperthyroidism and BMP to rule out electrolyte abnormalities causing palpitations but expect to be negative. Provided Vannessa with relaxation resources and prescribed hydroxyzine for future panic attacks. Also referred her to Behavioral Health for therapy and further tools to manage ongoing stressors.  - TSH with free T4 reflex  - Basic Metabolic Panel ([facility])  - BEHAVIORAL HEALTH REFERRAL ([facility] inter[facility] and external)  - hydrOXYzine (ATARAX) 25 MG tablet; Take 1-2 tablets (25-50 mg) by mouth every 6 hours as needed for anxiety  Dispense: 30 tablet; Refill: 0    3. Benign essential hypertension  Hypertensive today with -178/75-90, previously hypertensive at clinic visits. Has been told she should be treating hypertension. Vannessa reports that she is starting a weight loss regimen. Recommended she exercise routinely to help with weight loss as this can also decrease BP. Prescribed amlodipine for hypertension and ordered lipid panel and Hgb A1c for further investigation of hypertension. Asked Vannessa to follow-up in 2 weeks to discuss labs and evaluate amlodipine effectiveness.  - Lipid Cascade ([facility])  - Hemoglobin A1c ([facility])  - amLODIPine (NORVASC) 10 MG tablet; Take 0.5 tablets (5 mg) by mouth daily  Dispense: 30 tablet; Refill: 0    There are no discontinued medications.  Options for treatment and follow-up care were reviewed with the patient. Vannessa ALEJANDRO Leggett  engaged in the decision making process and verbalized understanding of the options discussed and agreed with the final plan.    Case Bishop, MS4    I  was present with the medical student who participated in the service and in the documentation of this note. I have verified the history and personally performed the physical exam and medical decision making, and have verified the content of the note, which accurately reflects my assessment of the patient and the plan of care.   Wilber Anton MD

## 2018-05-24 NOTE — PROGRESS NOTES
Preceptor Attestation:   Patient seen, evaluated and discussed with the resident. I have verified the content of the note, which accurately reflects my assessment of the patient and the plan of care.   Supervising Physician:  Gurdeep Mcgovern MD

## 2018-05-25 ENCOUNTER — TELEPHONE (OUTPATIENT)
Dept: FAMILY MEDICINE | Facility: CLINIC | Age: 48
End: 2018-05-25

## 2018-05-25 ASSESSMENT — ANXIETY QUESTIONNAIRES: GAD7 TOTAL SCORE: 14

## 2018-05-25 ASSESSMENT — PATIENT HEALTH QUESTIONNAIRE - PHQ9: SUM OF ALL RESPONSES TO PHQ QUESTIONS 1-9: 14

## 2018-05-25 NOTE — TELEPHONE ENCOUNTER
Mental Health Referral:  Please schedule with anyone on the behavioral health team as soon as possible.      If you are unable to reach the patient after two phone attempts, please send a letter and close the encounter.      Thank you!

## 2018-06-01 ENCOUNTER — OFFICE VISIT (OUTPATIENT)
Dept: FAMILY MEDICINE | Facility: CLINIC | Age: 48
End: 2018-06-01
Payer: COMMERCIAL

## 2018-06-01 VITALS
RESPIRATION RATE: 16 BRPM | TEMPERATURE: 98.1 F | DIASTOLIC BLOOD PRESSURE: 99 MMHG | BODY MASS INDEX: 39.19 KG/M2 | OXYGEN SATURATION: 100 % | HEART RATE: 77 BPM | SYSTOLIC BLOOD PRESSURE: 182 MMHG | WEIGHT: 245 LBS

## 2018-06-01 DIAGNOSIS — M99.9 NONALLOPATHIC LESION OF THORACIC REGION: ICD-10-CM

## 2018-06-01 DIAGNOSIS — I10 BENIGN ESSENTIAL HYPERTENSION: Primary | ICD-10-CM

## 2018-06-01 DIAGNOSIS — M54.2 NECK PAIN: ICD-10-CM

## 2018-06-01 DIAGNOSIS — M99.9 NONALLOPATHIC LESION OF CERVICAL REGION: ICD-10-CM

## 2018-06-01 RX ORDER — NEBULIZER AND COMPRESSOR
EACH MISCELLANEOUS
Qty: 1 KIT | Refills: 0 | Status: SHIPPED | OUTPATIENT
Start: 2018-06-01

## 2018-06-01 RX ORDER — MULTIPLE VITAMINS W/ MINERALS TAB 9MG-400MCG
1 TAB ORAL DAILY
Qty: 100 TABLET | Refills: 3 | COMMUNITY
Start: 2018-06-01

## 2018-06-01 NOTE — PROGRESS NOTES
DELFINO Hurd is a 48 year old female who presents today for   Chief Complaint   Patient presents with     Neck Pain     pt reports that her pain in the back of the neck has gone away since last visit. OMT visit. [place] tight at work today       Patient presents with pain in bilateral shoulder blades. Worse with sitting all day. Works at a computer desk for work.       Regarding her blood pressure, she picked up the Rx for amlodipine but did not start taking it. She just purchased a heart health supplement and was told by the  that it would help decrease her blood pressure. She has chosen to do this instead for prevention instead of taking the amlodipine.     Patient Active Problem List   Diagnosis     Palpitations       Current Outpatient Prescriptions   Medication Sig Dispense Refill     amLODIPine (NORVASC) 10 MG tablet Take 0.5 tablets (5 mg) by mouth daily (Patient not taking: Reported on 6/1/2018) 30 tablet 0     hydrOXYzine (ATARAX) 25 MG tablet Take 1-2 tablets (25-50 mg) by mouth every 6 hours as needed for anxiety (Patient not taking: Reported on 6/1/2018) 30 tablet 0       Active medical problems and medication list reviewed     No Known Allergies    Social History     Social History     Marital status: Single     Spouse name: N/A     Number of children: N/A     Years of education: N/A     Occupational History     Not on file.     Social History Main Topics     Smoking status: Never Smoker     Smokeless tobacco: Never Used     Alcohol use No     Drug use: No     Sexual activity: Yes     Partners: Male     Birth control/ protection: None, Post-menopausal     Other Topics Concern     Not on file     Social History Narrative            Review of Systems:     Review of Systems   Constitutional: Negative for chills and fever.   Musculoskeletal: Positive for back pain, myalgias and neck pain.   Neurological: Negative for sensory change and focal weakness.                 Physical Exam:      Vitals:    06/01/18 1611 06/01/18 1615   BP: (!) 154/91 (!) 182/99   Pulse: 77    Resp: 16    Temp: 98.1  F (36.7  C)    TempSrc: Oral    SpO2: 100%    Weight: 245 lb (111.1 kg)        Physical Exam   Constitutional: She is oriented to person, place, and time and well-developed, well-nourished, and in no distress. No distress.   HENT:   Head: Normocephalic and atraumatic.   Eyes: Conjunctivae are normal.   Neck: Neck supple.   Pulmonary/Chest: Effort normal.   Neurological: She is alert and oriented to person, place, and time.   Skin: Skin is warm and dry.   Psychiatric: Mood, memory, affect and judgment normal.         OMT Procedure Note:      Body Region: Cervical    Somatic Dysfunction #1: Cervical hypertonicity  Treatment: Myofascial: direct and Soft Tissue   Outcome: Improved    Body Region: Thoracic    Somatic Dysfunction #1: Thoracic hypertonicity  Treatment: Myofascial: direct, counterstrain, articulatory techniques and Soft Tissue   Outcome: Improved      Assessment and Plan     Vannessa was seen today for neck pain.    Diagnoses and all orders for this visit:    Benign essential hypertension  Uncontrolled. Resistant to starting medication. Discussed that I did not think the supplement would decrease her blood pressure. Encouraged low salt diet and exercise. Gave Rx for BP cuff, patient can measure at home. She is agreeable to starting the amlodipine if her BP remains elevated.   -     Blood Pressure Monitoring (ADULT BLOOD PRESSURE CUFF LG) KIT; Use to check blood pressure 3 times a week.    Neck pain  Gave note for ergonomic workstation at work, patient will pursue this to see if it will help with her neck and back pain.     Nonallopathic lesion of cervical region  -     OSTEOPATHIC MANIP,1-2 BODY REGN    Nonallopathic lesion of thoracic region  -     OSTEOPATHIC MANIP,1-2 BODY REGN        OMT completed without incident. Patient tolerated treatment well. Advised that pain is occasionally worse during the  first 24 hours after treatment. Patient to return in 2-4 weeks or as needed for repeat OMT.       Lenore Harris DO

## 2018-06-01 NOTE — PATIENT INSTRUCTIONS
Here is the plan from today's visit    1. Benign essential hypertension  - Blood Pressure Monitoring (ADULT BLOOD PRESSURE CUFF LG) KIT; Use to check blood pressure 3 times a week.  Dispense: 1 kit; Refill: 0    2. Neck pain    Please call or return to clinic if your symptoms don't go away.    Follow up plan  Please make a clinic appointment for follow up with your primary physician Alannah Espitia MD in 1 month for follow-up.    Thank you for coming to [facility] today.  Lab Testing:  **If you had lab testing today and your results are reassuring or normal they will be mailed to you or sent through [employer] within 7 days.   **If the lab tests need quick action we will call you with the results.  The phone number we will call with results is # [phone] (home) . If this is not the best number please call our clinic and change the number.  Medication Refills:  If you need any refills please call your pharmacy and they will contact us.   If you need to  your refill at a new pharmacy, please contact the new pharmacy directly. The new pharmacy will help you get your medications transferred faster.   Scheduling:  If you have any concerns about today's visit or wish to schedule another appointment please call our office during normal business hours [phone] (8-5:00 M-F)  If a referral was made to a [facility] and you don't get a call from central scheduling please call [phone].  If a Mammogram was ordered for you at [facility] call [phone] to schedule or change your appointment.  If you had an XRay/CT/Ultrasound/MRI ordered the number is [phone] to schedule or change your radiology appointment.   Medical Concerns:  If you have urgent medical concerns please call [phone] at any time of the day.    Lenore Harris, DO    Ergonomics: Your Work Area    Is your workstation arranged so you can work efficiently? That means having your monitor, keyboard, mouse,  and workstation tools--such as your telephone and document tellez--well placed. When they are, you'll feel better and most likely get more done.  Monitor  Screen height    Sit with your lower back supported and feet firmly on the floor or footrest. Hold your head upright and look straight at the screen. The top of your screen should be at or slightly below eye level.    If it isn't, ask someone to help you raise or lower your monitor. Place it at a viewing height that allows you to keep your head upright. (If you can't adjust your screen height, place a stand or board beneath your monitor.)  Screen distance    Measure the distance from your eyes to the screen. For most people, the screen should be 18 to 30 inches from your eyes, or at about arm's length.    If it isn't, get help moving your monitor to the desired distance.  Keyboard    Place your fingers on the keyboard's middle row of letters. Your wrists should be straight and relaxed.    If they aren't, adjust your keyboard height up or down until your wrists are straight.    If the keyboard is too low, put a pad of paper under it.    If your wrists are still not straight, readjust your chair height. Make sure your feet remain on the floor or footrest.    Wrist rests may be used to support your hands when you are not actively keying (typing).  Workstation tools    Arrange your tools so the things you use most are within easy reach. The things you don't use often can be farther away.    Place your document tellez at the same height and distance as your screen.    If you use the telephone a lot, think about using a headset.  Date Last Reviewed: 1/1/2017 2000-2017 [employer]. [address] [place] [address], [place] [address]. All rights reserved. This information is not intended as a substitute for professional medical care. Always follow your healthcare professional's instructions.        Ergonomics: Adjust Your Chair  If you sit much of the day, your  chair is your main support. A well-adjusted chair improves your circulation. It also helps prevent backaches and fatigue. You can increase your comfort by adjusting the chair's backrest position and height to fit your body.  Backrest    Sit at your workstation, leaning back slightly with your back firmly against the chair. The backrest should fit snugly against your lower back.    If it doesn't, adjust the backrest until your lower back is fully supported.    If you can't adjust the backrest, use a small, thin, firm pillow or rolled-up towel to support your lower back.  Chair height  Arm Position    Place your fingers on the keyboard's middle row of letters. Your upper arms should hang comfortably at your sides. Your forearms should be parallel to the floor.    If they are not, adjust your chair height until your forearms are parallel to the floor.  Leg position    Keep your knees at or below the level of your hips. It may help to slide your feet forward until your knees are at a 90- to 110-degree angle. Your feet should rest firmly on the floor. There should be 3 to 6 inches of legroom between your lap and desk or keyboard tray.    If you have less than 3 inches of legroom, try to raise your desk or keyboard tray height.    If you can't adjust your chair height and your feet don't reach the floor, use something as a foot rest. A box or binder can work. If you wear flat shoes, a level surface works best. If you wear heels, a slanted surface is better.  Date Last Reviewed: 12/28/2015 2000-2017 [employer]. [address] [place] [address], [place] [address]. All rights reserved. This information is not intended as a substitute for professional medical care. Always follow your healthcare professional's instructions.

## 2018-06-01 NOTE — MR AVS SNAPSHOT
After Visit Summary   6/1/2018    Vannessa Leggett    MRN: [id number]           Patient Information     Date Of Birth          1970        Visit Information        Provider Department      6/1/2018 4:00 PM Lenore Harris DO [facility]        Today's Diagnoses     Benign essential hypertension    -  1    Neck pain          Care Instructions    Here is the plan from today's visit    1. Benign essential hypertension  - Blood Pressure Monitoring (ADULT BLOOD PRESSURE CUFF LG) KIT; Use to check blood pressure 3 times a week.  Dispense: 1 kit; Refill: 0    2. Neck pain    Please call or return to clinic if your symptoms don't go away.    Follow up plan  Please make a clinic appointment for follow up with your primary physician Alannah Espitia MD in 1 month for follow-up.    Thank you for coming to [facility] today.  Lab Testing:  **If you had lab testing today and your results are reassuring or normal they will be mailed to you or sent through [employer] within 7 days.   **If the lab tests need quick action we will call you with the results.  The phone number we will call with results is # [phone] (home) . If this is not the best number please call our clinic and change the number.  Medication Refills:  If you need any refills please call your pharmacy and they will contact us.   If you need to  your refill at a new pharmacy, please contact the new pharmacy directly. The new pharmacy will help you get your medications transferred faster.   Scheduling:  If you have any concerns about today's visit or wish to schedule another appointment please call our office during normal business hours [phone] (8-5:00 M-F)  If a referral was made to a [facility] and you don't get a call from central scheduling please call [phone].  If a Mammogram was ordered for you at [facility] call [phone] to schedule or change your appointment.  If  you had an XRay/CT/Ultrasound/MRI ordered the number is [phone] to schedule or change your radiology appointment.   Medical Concerns:  If you have urgent medical concerns please call [phone] at any time of the day.    Lenore Harris, DO    Ergonomics: Your Work Area    Is your workstation arranged so you can work efficiently? That means having your monitor, keyboard, mouse, and workstation tools--such as your telephone and document tellez--well placed. When they are, you'll feel better and most likely get more done.  Monitor  Screen height    Sit with your lower back supported and feet firmly on the floor or footrest. Hold your head upright and look straight at the screen. The top of your screen should be at or slightly below eye level.    If it isn't, ask someone to help you raise or lower your monitor. Place it at a viewing height that allows you to keep your head upright. (If you can't adjust your screen height, place a stand or board beneath your monitor.)  Screen distance    Measure the distance from your eyes to the screen. For most people, the screen should be 18 to 30 inches from your eyes, or at about arm's length.    If it isn't, get help moving your monitor to the desired distance.  Keyboard    Place your fingers on the keyboard's middle row of letters. Your wrists should be straight and relaxed.    If they aren't, adjust your keyboard height up or down until your wrists are straight.    If the keyboard is too low, put a pad of paper under it.    If your wrists are still not straight, readjust your chair height. Make sure your feet remain on the floor or footrest.    Wrist rests may be used to support your hands when you are not actively keying (typing).  Workstation tools    Arrange your tools so the things you use most are within easy reach. The things you don't use often can be farther away.    Place your document tellez at the same height and distance as your screen.    If you use the  telephone a lot, think about using a headset.  Date Last Reviewed: 1/1/2017 2000-2017 [employer]. [address] [facility], [place] [address]. All rights reserved. This information is not intended as a substitute for professional medical care. Always follow your healthcare professional's instructions.        Ergonomics: Adjust Your Chair  If you sit much of the day, your chair is your main support. A well-adjusted chair improves your circulation. It also helps prevent backaches and fatigue. You can increase your comfort by adjusting the chair's backrest position and height to fit your body.  Backrest    Sit at your workstation, leaning back slightly with your back firmly against the chair. The backrest should fit snugly against your lower back.    If it doesn't, adjust the backrest until your lower back is fully supported.    If you can't adjust the backrest, use a small, thin, firm pillow or rolled-up towel to support your lower back.  Chair height  Arm Position    Place your fingers on the keyboard's middle row of letters. Your upper arms should hang comfortably at your sides. Your forearms should be parallel to the floor.    If they are not, adjust your chair height until your forearms are parallel to the floor.  Leg position    Keep your knees at or below the level of your hips. It may help to slide your feet forward until your knees are at a 90- to 110-degree angle. Your feet should rest firmly on the floor. There should be 3 to 6 inches of legroom between your lap and desk or keyboard tray.    If you have less than 3 inches of legroom, try to raise your desk or keyboard tray height.    If you can't adjust your chair height and your feet don't reach the floor, use something as a foot rest. A box or binder can work. If you wear flat shoes, a level surface works best. If you wear heels, a slanted surface is better.  Date Last Reviewed: 12/28/2015 2000-2017 [employer] [address]  [place] [address]. All rights reserved. This information is not intended as a substitute for professional medical care. Always follow your healthcare professional's instructions.                Follow-ups after your visit        Your next 10 appointments already scheduled     2018 11:00 AM CDT   Return Visit with [facility] ([facility])    [address],  [address]  [place] [address]   [phone]              Who to contact     Please call your clinic at [phone] to:    Ask questions about your health    Make or cancel appointments    Discuss your medicines    Learn about your test results    Speak to your doctor            Additional Information About Your Visit        MyChart Information     [employer] is an electronic gateway that provides easy, online access to your medical records. With [employer], you can request a clinic appointment, read your test results, renew a prescription or communicate with your care team.     To sign up for [employer] visit the website at [email or web address]   You will be asked to enter the access code listed below, as well as some personal information. Please follow the directions to create your username and password.     Your access code is: [email or web address]  Expires: 2018 12:57 PM     Your access code will  in 90 days. If you need help or a new code, please contact your [facility] or call [phone] for assistance.        Care EveryWhere ID     This is your Care EveryWhere ID. This could be used by other organizations to access your [place] medical records  [id number]        Your Vitals Were     Pulse Temperature Respirations Pulse Oximetry BMI (Body Mass Index)       77 98.1  F (36.7  C) (Oral) 16 100% 39.19 kg/m2        Blood Pressure from Last 3 Encounters:   18 (!) 182/99   18 178/75   18 153/89    Weight from Last 3  Encounters:   06/01/18 245 lb (111.1 kg)   05/24/18 243 lb 9.6 oz (110.5 kg)   03/14/18 236 lb 6.4 oz (107.2 kg)              Today, you had the following     No orders found for display         Today's Medication Changes          These changes are accurate as of 6/1/18  4:48 PM.  If you have any questions, ask your nurse or doctor.               Start taking these medicines.        Dose/Directions    Adult Blood Pressure Cuff Lg Kit   Used for:  Benign essential hypertension   Started by:  Lenore Harris, DO        Use to check blood pressure 3 times a week.   Quantity:  1 kit   Refills:  0            Where to get your medicines      These medications were sent to [facility] [place], [place] - [address] [facility] [address]     Phone:  [phone]     Adult Blood Pressure Cuff Lg Kit                Primary Care Provider Office Phone # Fax #    Alannah Danika Espitia [phone]       [facility] [address]  [facility] [address]        Equal Access to Services     YEFRI CLARKE AH: Hadii juani ku hadasho Soomaali, waaxda luqadaha, qaybta kaalmada adeegyada, waxjohn luain haychris soliman . So [facility] [phone].    ATENCIÓN: Si habla español, tiene a andino disposición servicios gratuitos de asistencia lingüística. Llame al [phone].    We comply with applicable federal civil rights laws and [place] laws. We do not discriminate on the basis of race, color, national origin, age, disability, sex, sexual orientation, or gender identity.            Thank you!     Thank you for choosing [facility]  for your care. Our goal is always to provide you with excellent care. Hearing back from our patients is one way we can continue to improve our services. Please take a few minutes to complete the written survey that you may receive in the mail after your visit with us. Thank you!             Your Updated Medication List - Protect  others around you: Learn how to safely use, store and throw away your medicines at www.disposemymeds.org.          This list is accurate as of 6/1/18  4:48 PM.  Always use your most recent med list.                   Brand Name Dispense Instructions for use Diagnosis    Adult Blood Pressure Cuff Lg Kit     1 kit    Use to check blood pressure 3 times a week.    Benign essential hypertension       amLODIPine 10 MG tablet    NORVASC    30 tablet    Take 0.5 tablets (5 mg) by mouth daily    Benign essential hypertension       hydrOXYzine 25 MG tablet    ATARAX    30 tablet    Take 1-2 tablets (25-50 mg) by mouth every 6 hours as needed for anxiety    Panic attack       Multi-vitamin Tabs tablet     100 tablet    Take 1 tablet by mouth daily

## 2018-06-01 NOTE — LETTER
Current Work Restrictions.    Re:Vannessa Leggett  1970    Primary Provider Alannah Espitia    Ms. Leggett is under my medical care. She would benefit from an ergonomic work station including standing desk. Please feel free to contact my office with any questions or concerns.     Lenore Harris, DO

## 2018-06-04 ASSESSMENT — ENCOUNTER SYMPTOMS
NECK PAIN: 1
FEVER: 0
BACK PAIN: 1
FOCAL WEAKNESS: 0
SENSORY CHANGE: 0
MYALGIAS: 1
CHILLS: 0

## 2018-06-20 ENCOUNTER — DOCUMENTATION ONLY (OUTPATIENT)
Dept: FAMILY MEDICINE | Facility: CLINIC | Age: 48
End: 2018-06-20

## 2018-06-20 NOTE — PROGRESS NOTES
"When opening a documentation only encounter, be sure to enter in \"Chief Complaint\" Forms and in \" Comments\" Title of form, description if needed.    Vannessa is a 48 year old  female  Form received via: Patient Drop Off  Form now resides in: Provider Ready    Jeanne Kent CMA          Form has been completed by provider.     Form sent out via: Placed at  for patient  on 6/20/18  Patient informed: Yes  Output date: June 20, 2018    Jeanne Kent CMA      **Please close the encounter**                    "

## 2018-06-22 DIAGNOSIS — I10 BENIGN ESSENTIAL HYPERTENSION: Primary | ICD-10-CM

## 2018-10-10 ENCOUNTER — OFFICE VISIT (OUTPATIENT)
Dept: FAMILY MEDICINE | Facility: CLINIC | Age: 48
End: 2018-10-10
Payer: COMMERCIAL

## 2018-10-10 VITALS
TEMPERATURE: 98.1 F | SYSTOLIC BLOOD PRESSURE: 160 MMHG | OXYGEN SATURATION: 98 % | HEART RATE: 77 BPM | DIASTOLIC BLOOD PRESSURE: 92 MMHG

## 2018-10-10 DIAGNOSIS — Z53.9 ERRONEOUS ENCOUNTER--DISREGARD: Primary | ICD-10-CM

## 2018-10-10 NOTE — PROGRESS NOTES
"       HPI       Vannessa Leggett is a 48 year old  who presents for   Chief Complaint   Patient presents with     Neck Pain     tissue in neck radiating pain up neck into face. denies SOB, nausea, headache, vision changes, chest pain       {Superlists :403545:x}    {:657984}   +++++++  {Additional Concerns  (FM):620623}    Problem, Medication and Allergy Lists were {Reviewed Lists:115435}.    Patient is {New/Established:662155::\"an established patient of this clinic.\"}.         Review of Systems:   Review of Systems         Physical Exam:     Vitals:    10/10/18 1023 10/10/18 1024   BP: (!) 189/97 (!) 160/92   BP Location: Left arm Left arm   Patient Position: Sitting Sitting   Cuff Size: Adult Regular Adult Regular   Pulse: 77    Temp: 98.1  F (36.7  C)    TempSrc: Oral    SpO2: 98%      There is no height or weight on file to calculate BMI.  {[facility] VITALS OPTIONS:614257::\"Vitals were reviewed and were normal\"}     Physical Exam  {  Detailed Ortho and mental status exam:950131}    Results:   {UMP FM result choices :442470}    Assessment and Plan        {Assessment/Plan Pick List :771197}       There are no discontinued medications.    Options for treatment and follow-up care were reviewed with the patient. Vannessa Leggett  engaged in the decision making process and verbalized understanding of the options discussed and agreed with the final plan.    Catina Kaplan MD  Family Medicine PGY3 Resident      "

## 2018-10-10 NOTE — PROGRESS NOTES
At 11:35 pt stated she didn't want to wait anymore, has been waiting for an hour. RN relayed again that there was no guarantee pt could be seen early. RN states MD was in with her 11am appt and would be in soon but patient walked out    Ciara Flood RN

## 2018-10-10 NOTE — MR AVS SNAPSHOT
After Visit Summary   10/10/2018    Vannessa Leggett    MRN: [id number]           Patient Information     Date Of Birth          1970        Visit Information        Provider Department      10/10/2018 11:20 AM Lauern Kaplan MD [facility]        Today's Diagnoses     ERRONEOUS ENCOUNTER--DISREGARD    -  1       Follow-ups after your visit        Who to contact     Please call your clinic at [phone] to:    Ask questions about your health    Make or cancel appointments    Discuss your medicines    Learn about your test results    Speak to your doctor            Additional Information About Your Visit        Care EveryWhere ID     This is your Care EveryWhere ID. This could be used by other organizations to access your [place] medical records  [id number]        Your Vitals Were     Pulse Temperature Pulse Oximetry             77 98.1  F (36.7  C) (Oral) 98%          Blood Pressure from Last 3 Encounters:   10/10/18 (!) 160/92   06/01/18 (!) 182/99   05/24/18 178/75    Weight from Last 3 Encounters:   06/01/18 245 lb (111.1 kg)   05/24/18 243 lb 9.6 oz (110.5 kg)   03/14/18 236 lb 6.4 oz (107.2 kg)              Today, you had the following     No orders found for display       Primary Care Provider Office Phone # Fax #    Moni Nieto [phone]       [address] [address]        Equal Access to Services     [facility] AH: Hadii aad ku hadasho Soharrisonali, waaxda luqadaha, qaybta kaalmada adeegyada, joanne loco haychris soliman . So [facility] [phone].    ATENCIÓN: Si habla español, tiene a andino disposición servicios gratuitos de asistencia lingüística. Llkali al [phone].    We comply with applicable federal civil rights laws and [place] laws. We do not discriminate on the basis of race, color, national origin, age, disability, sex, sexual orientation, or gender identity.            Thank you!     Thank you  for choosing \[facility]\"" [facility]  for your care. Our goal is always to provide you with excellent care. Hearing back from our patients is one way we can continue to improve our services. Please take a few minutes to complete the written survey that you may receive in the mail after your visit with us. Thank you!             Your Updated Medication List - Protect others around you: Learn how to safely use, store and throw away your medicines at www.disposemymeds.org.          This list is accurate as of 10/10/18 11:59 PM.  Always use your most recent med list.                   Brand Name Dispense Instructions for use Diagnosis    Adult Blood Pressure Cuff Lg Kit     1 kit    Use to check blood pressure 3 times a week.    Benign essential hypertension       amLODIPine 10 MG tablet    NORVASC    30 tablet    Take 0.5 tablets (5 mg) by mouth daily    Benign essential hypertension       Multi-vitamin Tabs tablet     100 tablet    Take 1 tablet by mouth daily        order for DME     1 Units    Equipment being ordered: blood pressure machine. Check blood pressure 3 times a week.    Benign essential hypertension

## 2018-10-10 NOTE — PROGRESS NOTES
Patient had left clinic before I could see her. See RN note for details.     Catina Kaplan MD  Family Medicine PGY3 Resident